# Patient Record
(demographics unavailable — no encounter records)

---

## 2017-07-29 NOTE — C.PDOC
History Of Present Illness


30 y/o female presents to the ED for evaluation of suprapubic pressure and foul 

smelling urine since this morning after waking up. Also complains of itchy rash 

to chest and arms, state she has history of eczema and symptoms feel similar.  

Rash is not contagious, other family members have touched her and do not have 

similar lesions.  Patient denies dysuria/hematuria, vaginal bleeding/discharge, 

pelvic pain, or fever.





Time Seen by Provider: 07/29/17 16:53


Chief Complaint (Nursing): Female Genitourinary


History Per: Patient


History/Exam Limitations: no limitations


Onset/Duration Of Symptoms: Hrs


Current Symptoms Are (Timing): Still Present


Severity: Mild


Quality Of Discomfort: "Pain"


Associated Symptoms: Urinary Symptoms (foul smelling urine).  denies: Fever, 

Chills, Nausea, Vomiting, Diarrhea, Loss Of Appetite, Back Pain, Chest Pain, 

Constipation


Alleviating Factors: None


Additional History Per: Patient


Abnormal Vaginal Bleeding: No





Past Medical History


Reviewed: Historical Data, Nursing Documentation, Vital Signs


Vital Signs: 


 Last Vital Signs











Temp  98 F   07/29/17 16:53


 


Pulse  61   07/29/17 16:53


 


Resp  20   07/29/17 16:53


 


BP  105/61   07/29/17 16:53


 


Pulse Ox  98   07/29/17 18:41














- Medical History


PMH: No Chronic Diseases





- [facility] Procedures








CLOSURE SKIN & SUBCUTANEOUS NEC (12/05/13)


TETANUS TOXOID ADMINIST (12/05/13)








Family History: States: No Known Family Hx





- Social History


Hx Tobacco Use: No


Hx Alcohol Use: Yes


Hx Substance Use: No





- Immunization History


Hx Tetanus Toxoid Vaccination: Yes (12/05/2013)


Hx Influenza Vaccination: No


Hx Pneumococcal Vaccination: No





Review Of Systems


Except As Marked, All Systems Reviewed And Found Negative.


Constitutional: Negative for: Fever, Chills


Respiratory: Negative for: Shortness of Breath


Gastrointestinal: Positive for: Abdominal Pain (suprapubic pressure).  Negative 

for: Nausea, Vomiting, Diarrhea


Genitourinary: Positive for: Other (foul smelling urine).  Negative for: Dysuria

, Hematuria, Vaginal Discharge, Vaginal Bleeding


Musculoskeletal: Negative for: Back Pain


Skin: Positive for: Rash (rash to chest and arms)





Physical Exam





- Physical Exam


Appears: Well, Non-toxic, No Acute Distress


Skin: Warm, Dry, Rash (scattered dry scaly rash with some coin shaped patches 

on the chest and arms, non-vesicular)


Head: Normacephalic


Eye(s): bilateral: Normal Inspection


Neck: Supple


Cardiovascular: Rhythm Regular


Respiratory: Normal Breath Sounds, No Rales, No Rhonchi, No Wheezing


Gastrointestinal/Abdominal: Normal Exam, Bowel Sounds, Soft, No Tenderness


Back: Normal Inspection, No CVA Tenderness


Neurological/Psych: Oriented x3





ED Course And Treatment


O2 Sat by Pulse Oximetry: 98 (RA)


Pulse Ox Interpretation: Normal


Progress Note: Urinalysis, Upreg ordered and reviewed. Patient given PO 

Prednisone and Claritin in ED (suspect nummular eczema).  UA shows mild UTI, PO 

Ciprofloxacin given.  Ucx ordered and pending.  Rxs given for Prednisone, 

Claritin, Ciprofloxacin and eczema cream, and patient instructed to follow up 

with PMD/clinic in 1-2 days.  She understands she should return to ED if 

symptoms worsen.


Reevaluation Time: 17:35


Reassessment Condition: Improved





Disposition


Counseled Patient/Family Regarding: Studies Performed, Diagnosis, Need For 

Followup, Rx Given





- Disposition


Referrals: 


[facility] [Outside]


Disposition: HOME/ ROUTINE


Disposition Time: 17:35


Condition: STABLE


Additional Instructions: 


FOLLOW UP WITH YOUR DOCTOR IN 1-2 DAYS





USE MEDICATIONS AS DIRECTED





RETURN TO ER IF SYMPTOMS WORSEN


Prescriptions: 


Ciprofloxacin [Cipro] 1 tab PO BID #14 tab


Colloidal Oatmeal [Eczema] 1 appl TP TID #1 lotion


Loratadine [Claritin] 10 mg PO DAILY PRN #15 tab


 PRN Reason: itching/allergies


predniSONE [predniSONE Tab] 40 mg PO DAILY #6 tab


Instructions:  Urinary Tract Infection in Women (ED), Eczema in Children (ED)


Forms:  [facility] (English)


Print Language: ENGLISH





- POA


Present On Arrival: None





- Clinical Impression


Clinical Impression: 


 Nummular eczema, UTI (urinary tract infection)








- Scribe Statement


The provider has reviewed the documentation as recorded by the Jovanyibmaryjane Barr





All medical record entries made by the Scribe were at my direction and 

personally dictated by me. I have reviewed the chart and agree that the record 

accurately reflects my personal performance of the history, physical exam, 

medical decision making, and the department course for this patient. I have 

also personally directed, reviewed, and agree with the discharge instructions 

and disposition.